# Patient Record
Sex: FEMALE | Race: OTHER
[De-identification: names, ages, dates, MRNs, and addresses within clinical notes are randomized per-mention and may not be internally consistent; named-entity substitution may affect disease eponyms.]

---

## 2022-12-30 ENCOUNTER — OFFICE VISIT (OUTPATIENT)
Dept: PRIMARY CARE CLINIC | Facility: CLINIC | Age: 27
End: 2022-12-30

## 2022-12-30 VITALS
HEIGHT: 62 IN | DIASTOLIC BLOOD PRESSURE: 72 MMHG | SYSTOLIC BLOOD PRESSURE: 118 MMHG | HEART RATE: 94 BPM | BODY MASS INDEX: 33.82 KG/M2 | WEIGHT: 183.8 LBS

## 2022-12-30 DIAGNOSIS — R30.0 DYSURIA: ICD-10-CM

## 2022-12-30 DIAGNOSIS — N89.8 VAGINAL DISCHARGE: Primary | ICD-10-CM

## 2022-12-30 LAB
BILIRUBIN, URINE, POC: NEGATIVE
BLOOD URINE, POC: NORMAL
GLUCOSE URINE, POC: NEGATIVE
HCG QUALITATIVE, POC: NORMAL
HCG, PREGNANCY, URINE, POC: NEGATIVE
KETONES, URINE, POC: NEGATIVE
LEUKOCYTE ESTERASE, URINE, POC: NEGATIVE
NITRITE, URINE, POC: NEGATIVE
PH, URINE, POC: 6 (ref 4.6–8)
PROTEIN,URINE, POC: NEGATIVE
SPECIFIC GRAVITY, URINE, POC: 1.01 (ref 1–1.03)
URINALYSIS CLARITY, POC: CLEAR
URINALYSIS COLOR, POC: YELLOW
UROBILINOGEN, POC: 0.2
VALID INTERNAL CONTROL, POC: POSITIVE

## 2022-12-30 PROCEDURE — 84703 CHORIONIC GONADOTROPIN ASSAY: CPT | Performed by: PHYSICIAN ASSISTANT

## 2022-12-30 PROCEDURE — 99203 OFFICE O/P NEW LOW 30 MIN: CPT | Performed by: PHYSICIAN ASSISTANT

## 2022-12-30 PROCEDURE — 81003 URINALYSIS AUTO W/O SCOPE: CPT | Performed by: PHYSICIAN ASSISTANT

## 2022-12-30 SDOH — ECONOMIC STABILITY: FOOD INSECURITY: WITHIN THE PAST 12 MONTHS, YOU WORRIED THAT YOUR FOOD WOULD RUN OUT BEFORE YOU GOT MONEY TO BUY MORE.: NEVER TRUE

## 2022-12-30 SDOH — ECONOMIC STABILITY: HOUSING INSECURITY
IN THE LAST 12 MONTHS, WAS THERE A TIME WHEN YOU DID NOT HAVE A STEADY PLACE TO SLEEP OR SLEPT IN A SHELTER (INCLUDING NOW)?: NO

## 2022-12-30 SDOH — ECONOMIC STABILITY: FOOD INSECURITY: WITHIN THE PAST 12 MONTHS, THE FOOD YOU BOUGHT JUST DIDN'T LAST AND YOU DIDN'T HAVE MONEY TO GET MORE.: NEVER TRUE

## 2022-12-30 SDOH — ECONOMIC STABILITY: INCOME INSECURITY: IN THE LAST 12 MONTHS, WAS THERE A TIME WHEN YOU WERE NOT ABLE TO PAY THE MORTGAGE OR RENT ON TIME?: NO

## 2022-12-30 ASSESSMENT — ENCOUNTER SYMPTOMS
ABDOMINAL DISTENTION: 0
ABDOMINAL PAIN: 0
COUGH: 0
DIARRHEA: 0
BACK PAIN: 0
CONSTIPATION: 0
SORE THROAT: 0
NAUSEA: 0
VOMITING: 0

## 2022-12-30 ASSESSMENT — SOCIAL DETERMINANTS OF HEALTH (SDOH)
HOW HARD IS IT FOR YOU TO PAY FOR THE VERY BASICS LIKE FOOD, HOUSING, MEDICAL CARE, AND HEATING?: NOT VERY HARD
WITHIN THE LAST YEAR, HAVE YOU BEEN KICKED, HIT, SLAPPED, OR OTHERWISE PHYSICALLY HURT BY YOUR PARTNER OR EX-PARTNER?: NO
WITHIN THE LAST YEAR, HAVE TO BEEN RAPED OR FORCED TO HAVE ANY KIND OF SEXUAL ACTIVITY BY YOUR PARTNER OR EX-PARTNER?: NO
WITHIN THE LAST YEAR, HAVE YOU BEEN HUMILIATED OR EMOTIONALLY ABUSED IN OTHER WAYS BY YOUR PARTNER OR EX-PARTNER?: NO
WITHIN THE LAST YEAR, HAVE YOU BEEN AFRAID OF YOUR PARTNER OR EX-PARTNER?: NO

## 2022-12-30 ASSESSMENT — PATIENT HEALTH QUESTIONNAIRE - PHQ9
1. LITTLE INTEREST OR PLEASURE IN DOING THINGS: 0
SUM OF ALL RESPONSES TO PHQ QUESTIONS 1-9: 0
SUM OF ALL RESPONSES TO PHQ QUESTIONS 1-9: 0
SUM OF ALL RESPONSES TO PHQ9 QUESTIONS 1 & 2: 0
SUM OF ALL RESPONSES TO PHQ QUESTIONS 1-9: 0
SUM OF ALL RESPONSES TO PHQ QUESTIONS 1-9: 0
2. FEELING DOWN, DEPRESSED OR HOPELESS: 0

## 2022-12-30 NOTE — PROGRESS NOTES
Zabrina Brody (:  1995) is a 28 y.o. female here for evaluation of the following chief complaint(s):  New Patient (concerned about an infection either UTI or yeast infection she thinks, she is also having irregular periods, did not have period this last month, had spotting), Vaginal Discharge, and Dysuria         ASSESSMENT/PLAN:  1. Vaginal discharge  -     AMB POC URINALYSIS DIP STICK AUTO W/O MICRO  -     AMB POC GONADOTROPIN, CHORIONIC (HCG); QUALITATIVE  -     Nuswab Vaginitis Plus (VG+); Future  2. Dysuria  Comments:  UA unremarkable   possible vaginitis - lab pending- will call with results. will need further eval if symptoms persist     Results for orders placed or performed in visit on 22   AMB POC URINALYSIS DIP STICK AUTO W/O MICRO   Result Value Ref Range    Color, Urine, POC yellow     Clarity, Urine, POC clear     Glucose, Urine, POC Negative Negative    Bilirubin, Urine, POC Negative Negative    Ketones, Urine, POC Negative Negative    Specific Gravity, Urine, POC 1.01 1.001 - 1.035    Blood, Urine, POC + Negative    pH, Urine, POC 6 4.6 - 8.0    Protein, Urine, POC Negative Negative    Urobilinogen, POC 0.2     Nitrate, Urine, POC Negative Negative    Leukocyte Esterase, Urine, POC Negative Negative   AMB POC GONADOTROPIN, CHORIONIC (HCG); QUALITATIVE   Result Value Ref Range    Valid Internal Control, POC positive     HCG, Pregnancy, Urine, POC Negative Negative    HCG Qualitative, POC          Return if symptoms worsen or fail to improve. Subjective   SUBJECTIVE/OBJECTIVE:  New pt here today to establish care and with some symptoms of vaginal discharge , frequent urination, and burning with urination for the last 2 weeks. She also says her periods have been irregular but last month didn't have one at all. She had ectopic pregnancy earlier this year and had surgery  in [place], [place]. She doesn't think they removed or tied her tubes.  She moved here to [place] about 3 months ago but has not been seen by a doctor yet. Pt says she has history of UTI and yeast infection but unsure if current symptoms are similar to what she had in the past.     LMP 10/31/22    Vaginal Discharge  The patient's primary symptoms include a genital odor, missed menses and vaginal discharge. The patient's pertinent negatives include no genital itching, genital lesions, genital rash, pelvic pain or vaginal bleeding. This is a new problem. The current episode started 1 to 4 weeks ago. The problem has been unchanged. Associated symptoms include dysuria. Pertinent negatives include no abdominal pain, back pain, chills, constipation, diarrhea, discolored urine, fever, flank pain, frequency, headaches, hematuria, joint pain, nausea, painful intercourse, rash, sore throat, urgency or vomiting. The vaginal discharge was white. There has been no bleeding. Allergies   Allergen Reactions    Methotrexate Rash     No current outpatient medications on file. No current facility-administered medications for this visit. History reviewed. No pertinent past medical history. History reviewed. No pertinent surgical history. Social History     Tobacco Use    Smoking status: Never    Smokeless tobacco: Never   Substance Use Topics    Alcohol use: Never    Drug use: Never      Family History   Problem Relation Age of Onset    Cancer Paternal Aunt     Cancer Paternal Grandmother     High Blood Pressure Other     Diabetes Other        Review of Systems   Constitutional:  Negative for chills, fatigue and fever. HENT:  Negative for sore throat. Respiratory:  Negative for cough. Cardiovascular:  Negative for chest pain. Gastrointestinal:  Negative for abdominal distention, abdominal pain, constipation, diarrhea, nausea and vomiting. Genitourinary:  Positive for dysuria, missed menses and vaginal discharge.  Negative for decreased urine volume, difficulty urinating, dyspareunia, enuresis, flank pain, frequency, genital sores, hematuria, pelvic pain, urgency and vaginal bleeding. Musculoskeletal:  Negative for back pain and joint pain. Skin:  Negative for rash. Neurological:  Negative for headaches. Objective     Vitals:    12/30/22 1308   BP: 118/72   Pulse: 94       Physical Exam  Vitals reviewed. Constitutional:       Appearance: Normal appearance. HENT:      Head: Normocephalic and atraumatic. Eyes:      Extraocular Movements: Extraocular movements intact. Pupils: Pupils are equal, round, and reactive to light. Cardiovascular:      Rate and Rhythm: Normal rate and regular rhythm. Pulmonary:      Effort: Pulmonary effort is normal.      Breath sounds: Normal breath sounds. Abdominal:      General: Bowel sounds are normal.      Palpations: Abdomen is soft. Hernia: There is no hernia in the left inguinal area or right inguinal area. Genitourinary:     Labia:         Right: No rash, tenderness or lesion. Left: No rash, tenderness or lesion. Vagina: Normal.   Musculoskeletal:         General: Normal range of motion. Cervical back: Normal range of motion and neck supple. Lymphadenopathy:      Lower Body: No right inguinal adenopathy. No left inguinal adenopathy. Skin:     General: Skin is warm and dry. Neurological:      General: No focal deficit present. Mental Status: She is alert and oriented to person, place, and time. Psychiatric:         Mood and Affect: Mood normal.         Behavior: Behavior normal.         Judgment: Judgment normal.                An electronic signature was used to authenticate this note.     --AURA Burris

## 2023-01-04 DIAGNOSIS — B96.89 BACTERIAL VAGINITIS: Primary | ICD-10-CM

## 2023-01-04 DIAGNOSIS — N76.0 BACTERIAL VAGINITIS: Primary | ICD-10-CM

## 2023-01-04 RX ORDER — METRONIDAZOLE 7.5 MG/G
1 GEL VAGINAL DAILY
Qty: 25 G | Refills: 0 | Status: SHIPPED | OUTPATIENT
Start: 2023-01-04 | End: 2023-01-09